# Patient Record
Sex: MALE | Race: OTHER | HISPANIC OR LATINO | Employment: FULL TIME | URBAN - METROPOLITAN AREA
[De-identification: names, ages, dates, MRNs, and addresses within clinical notes are randomized per-mention and may not be internally consistent; named-entity substitution may affect disease eponyms.]

---

## 2024-06-28 ENCOUNTER — HOSPITAL ENCOUNTER (EMERGENCY)
Facility: HOSPITAL | Age: 34
Discharge: HOME | End: 2024-06-28
Attending: EMERGENCY MEDICINE

## 2024-06-28 VITALS
SYSTOLIC BLOOD PRESSURE: 136 MMHG | OXYGEN SATURATION: 99 % | TEMPERATURE: 98 F | RESPIRATION RATE: 16 BRPM | DIASTOLIC BLOOD PRESSURE: 77 MMHG | WEIGHT: 180 LBS | BODY MASS INDEX: 28.93 KG/M2 | HEIGHT: 66 IN | HEART RATE: 80 BPM

## 2024-06-28 DIAGNOSIS — S05.02XA ABRASION OF LEFT CORNEA, INITIAL ENCOUNTER: Primary | ICD-10-CM

## 2024-06-28 PROCEDURE — 25000003 PHARM REV CODE 250: Performed by: EMERGENCY MEDICINE

## 2024-06-28 PROCEDURE — 63600175 PHARM REV CODE 636 W HCPCS: Performed by: EMERGENCY MEDICINE

## 2024-06-28 PROCEDURE — 90471 IMMUNIZATION ADMIN: CPT | Performed by: EMERGENCY MEDICINE

## 2024-06-28 PROCEDURE — 90715 TDAP VACCINE 7 YRS/> IM: CPT | Performed by: EMERGENCY MEDICINE

## 2024-06-28 PROCEDURE — 99283 EMERGENCY DEPT VISIT LOW MDM: CPT | Mod: 25

## 2024-06-28 RX ORDER — ERYTHROMYCIN 5 MG/G
OINTMENT OPHTHALMIC
Qty: 1 EACH | Refills: 0 | Status: SHIPPED | OUTPATIENT
Start: 2024-06-28 | End: 2024-06-28

## 2024-06-28 RX ORDER — TETRACAINE HYDROCHLORIDE 5 MG/ML
2 SOLUTION OPHTHALMIC
Status: COMPLETED | OUTPATIENT
Start: 2024-06-28 | End: 2024-06-28

## 2024-06-28 RX ORDER — ERYTHROMYCIN 5 MG/G
OINTMENT OPHTHALMIC
Qty: 1 EACH | Refills: 0 | Status: SHIPPED | OUTPATIENT
Start: 2024-06-28

## 2024-06-28 RX ADMIN — TETRACAINE HYDROCHLORIDE 2 DROP: 5 SOLUTION OPHTHALMIC at 11:06

## 2024-06-28 RX ADMIN — FLUORESCEIN SODIUM 1 EACH: 1 STRIP OPHTHALMIC at 11:06

## 2024-06-28 RX ADMIN — CLOSTRIDIUM TETANI TOXOID ANTIGEN (FORMALDEHYDE INACTIVATED), CORYNEBACTERIUM DIPHTHERIAE TOXOID ANTIGEN (FORMALDEHYDE INACTIVATED), BORDETELLA PERTUSSIS TOXOID ANTIGEN (GLUTARALDEHYDE INACTIVATED), BORDETELLA PERTUSSIS FILAMENTOUS HEMAGGLUTININ ANTIGEN (FORMALDEHYDE INACTIVATED), BORDETELLA PERTUSSIS PERTACTIN ANTIGEN, AND BORDETELLA PERTUSSIS FIMBRIAE 2/3 ANTIGEN 0.5 ML: 5; 2; 2.5; 5; 3; 5 INJECTION, SUSPENSION INTRAMUSCULAR at 12:06

## 2024-06-28 NOTE — ED PROVIDER NOTES
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  Patient denies any visual disturbances states that he has a foreign body sensation, tearing of the eye with photophobia.  Patient does not wear corrective lens of any type.  He is unsure of his last tetanus.  The patient is  speaking he is with his friend who speaks fluent English however the  line was used for this patient.    The history is provided by the patient. The history is limited by a language barrier. A  was used.     Review of patient's allergies indicates:  No Known Allergies  History reviewed. No pertinent past medical history.  History reviewed. No pertinent surgical history.  No family history on file.  Social History     Tobacco Use    Smoking status: Never    Smokeless tobacco: Never   Substance Use Topics    Alcohol use: Not Currently     Review of Systems   Constitutional: Negative.  Negative for fever.   Eyes:  Positive for photophobia and redness. Negative for pain, discharge, itching and visual disturbance.        Foreign body sensation left eye   Gastrointestinal: Negative.    Genitourinary: Negative.    Musculoskeletal: Negative.    Neurological: Negative.    Hematological: Negative.    Psychiatric/Behavioral: Negative.     All other systems reviewed and are negative.      Physical Exam     Initial Vitals [06/28/24 1055]   BP Pulse Resp Temp SpO2   136/77 80 16 97.9 °F (36.6 °C) 99 %      MAP       --         Physical Exam    Nursing note and vitals reviewed.  Constitutional: He appears well-developed and well-nourished.   HENT:   Head: Normocephalic and atraumatic.   Eyes: EOM and lids are normal.  Lids are everted and swept, no foreign bodies found. Left eye exhibits no chemosis, no discharge and no exudate. No foreign body present in the left eye. Left conjunctiva is injected. Left eye exhibits normal extraocular motion and no nystagmus. Left pupil is round and reactive.   Slit lamp exam:       The left eye shows corneal abrasion and fluorescein uptake. The left eye shows no corneal ulcer and no foreign body.       Patient noted to have 2 separate corneal abrasions to the left eye near the 11 o'clock position no evidence of foreign body identified, no pain with extraocular movements, no corneal ulcer, no irregularity of the pupil,   Cardiovascular:  Normal rate, regular rhythm, normal heart sounds and intact distal pulses.           Pulmonary/Chest: Breath sounds normal.           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  Patient denies any visual disturbances states that he has a foreign body sensation, tearing of the eye with photophobia.  Patient does not wear corrective lens of any type.  He is unsure of his last tetanus.  The patient is  speaking he is with his friend who speaks fluent English however the  line was used for this patient.    The history is provided by the patient. The history is limited by a language barrier. A  was used.     Considerations include but not limited to, corneal ulcer, corneal abrasion, retained foreign body, open  globe    33-year-old  male presents emergency department complaint of left eye pain.  Patient reports that he has working in an attic yesterday without wearing safety goggles when something on into his eye.  Patient complains of foreign body sensation, patient denies any visual disturbances he does have an injected conjunctiva, no circumferential swelling or erythema.  Extraocular movements are intact.  Pupil is normal and reactive no irregular shaped with the pupil consistent with open globe.  Patient's eye was stained with fluorescein stain to corneal abrasions noted around the 11 o'clock position.  Patient's tetanus status was updated he will be discharged home with antibiotic ointment, referred to Ophthalmology.  The patient is physical exam was performed with the language line, I have given the patient's all discharge instructions via the language line and I have answered all the patient's questions.    Risk  Prescription drug management.                                      Clinical Impression:  Final diagnoses:  [S05.02XA] Abrasion of left cornea, initial encounter (Primary)          ED Disposition Condition    Discharge Stable          ED Prescriptions       Medication Sig Dispense Start Date End Date Auth. Provider    erythromycin (ROMYCIN) ophthalmic ointment  (Status: Discontinued) Place a 1/2 inch ribbon of ointment into the lower eyelid. 1 each 6/28/2024 6/28/2024 Mickie Quiles FNP    erythromycin (ROMYCIN) ophthalmic ointment Place a 1/2 inch ribbon of ointment into the lower eyelid four times a day for 5 days 1 each 6/28/2024 -- Mickie Quiles FNP          Follow-up Information       Follow up With Specialties Details Why Contact Info    Pablo Holley MD Ophthalmology Schedule an appointment as soon as possible for a visit in 3 days  [address] [place] [address] [address]  [facility]  [facility] [id number]  [phone]               Mickie Quiles  [facility]  06/28/24 1159

## 2024-06-28 NOTE — DISCHARGE INSTRUCTIONS
Motrin or Tylenol for pain   Erythromycin ophthalmic ointment as directed   Follow up as directed   Return if condition becomes worse for any concerns